# Patient Record
Sex: FEMALE | Race: BLACK OR AFRICAN AMERICAN | Employment: FULL TIME | URBAN - METROPOLITAN AREA
[De-identification: names, ages, dates, MRNs, and addresses within clinical notes are randomized per-mention and may not be internally consistent; named-entity substitution may affect disease eponyms.]

---

## 2017-01-26 ENCOUNTER — OFFICE VISIT (OUTPATIENT)
Dept: FAMILY MEDICINE CLINIC | Facility: CLINIC | Age: 27
End: 2017-01-26

## 2017-01-26 VITALS
HEART RATE: 80 BPM | TEMPERATURE: 98 F | OXYGEN SATURATION: 98 % | RESPIRATION RATE: 18 BRPM | DIASTOLIC BLOOD PRESSURE: 78 MMHG | SYSTOLIC BLOOD PRESSURE: 102 MMHG

## 2017-01-26 DIAGNOSIS — N30.01 ACUTE CYSTITIS WITH HEMATURIA: Primary | ICD-10-CM

## 2017-01-26 LAB
BILIRUBIN: NEGATIVE
CONTROL LINE PRESENT WITH A CLEAR BACKGROUND (YES/NO): YES YES/NO
KETONES (URINE DIPSTICK): NEGATIVE MG/DL
MULTISTIX LOT#: ABNORMAL NUMERIC
NITRITE, URINE: NEGATIVE
PH, URINE: 7 (ref 4.5–8)
PREGNANCY TEST, URINE: NEGATIVE
SPECIFIC GRAVITY: 1.02 (ref 1–1.03)
UROBILINOGEN,SEMI-QN: 0.2 MG/DL (ref 0–1.9)

## 2017-01-26 PROCEDURE — 87086 URINE CULTURE/COLONY COUNT: CPT

## 2017-01-26 PROCEDURE — 81025 URINE PREGNANCY TEST: CPT

## 2017-01-26 PROCEDURE — 87186 SC STD MICRODIL/AGAR DIL: CPT

## 2017-01-26 PROCEDURE — 87077 CULTURE AEROBIC IDENTIFY: CPT

## 2017-01-26 PROCEDURE — 99203 OFFICE O/P NEW LOW 30 MIN: CPT

## 2017-01-26 PROCEDURE — 81003 URINALYSIS AUTO W/O SCOPE: CPT

## 2017-01-26 RX ORDER — SULFAMETHOXAZOLE AND TRIMETHOPRIM 800; 160 MG/1; MG/1
TABLET ORAL
Qty: 10 TABLET | Refills: 0 | Status: SHIPPED | OUTPATIENT
Start: 2017-01-26

## 2017-01-26 RX ORDER — METRONIDAZOLE 500 MG/1
500 TABLET ORAL 3 TIMES DAILY
COMMUNITY

## 2017-01-27 NOTE — PATIENT INSTRUCTIONS
URINARY TRACT INFECTION AVOIDANCE AND PREVENTION    1)  Avoid a full bladder or overfilled bladder. Be sure to wait an addiitional 10-15 seconds after you think you are done, as you may be able to finish going or go again.     \"Count to 10 and go again\"

## 2017-01-27 NOTE — PROGRESS NOTES
Berna Rocha is a 32year old female. CHIEF COMPLAINT:   Patient presents with:  UTI:  progessing over last 2days    HPI:   Patient presents with symptoms of UTI. Reports 2 day history of worsening urinary frequency and dysuria.   Denies flank pain, he Specimen Collected: 01/26/17  7:51 PM Last Resulted: 01/26/17  7:51 PM Lab Flowsheet Order Details View Encounter Lab and Collection Details Routing Result History                    URINALYSIS, AUTO, W/O SCOPE   Status: Final result     Visible to patient 2)  Do not postpone urinating or rush during urination. You want to be able to completely empty your bladder. 3)  Increase fluid intake at the first sign of infection. 4)  Do not use the same tissue to blow your nose as you do to wipe yourself.  Your hand

## 2017-07-29 ENCOUNTER — HOSPITAL ENCOUNTER (EMERGENCY)
Facility: HOSPITAL | Age: 27
Discharge: HOME | End: 2017-07-29
Attending: EMERGENCY MEDICINE
Payer: MEDICAID

## 2017-07-29 VITALS
RESPIRATION RATE: 18 BRPM | SYSTOLIC BLOOD PRESSURE: 112 MMHG | BODY MASS INDEX: 20.77 KG/M2 | OXYGEN SATURATION: 100 % | DIASTOLIC BLOOD PRESSURE: 70 MMHG | WEIGHT: 110 LBS | HEIGHT: 61 IN | TEMPERATURE: 98 F | HEART RATE: 70 BPM

## 2017-07-29 DIAGNOSIS — L03.116 CELLULITIS OF LEFT LOWER EXTREMITY: Primary | ICD-10-CM

## 2017-07-29 PROCEDURE — 99283 EMERGENCY DEPT VISIT LOW MDM: CPT

## 2017-07-29 RX ORDER — CLINDAMYCIN HYDROCHLORIDE 300 MG/1
300 CAPSULE ORAL EVERY 6 HOURS SCHEDULED
Status: DISCONTINUED | OUTPATIENT
Start: 2017-07-29 | End: 2017-07-29

## 2017-07-29 RX ORDER — CLINDAMYCIN HYDROCHLORIDE 300 MG/1
300 CAPSULE ORAL 3 TIMES DAILY
Qty: 30 CAPSULE | Refills: 0 | Status: SHIPPED | OUTPATIENT
Start: 2017-07-29 | End: 2017-08-05

## 2017-07-29 RX ORDER — IBUPROFEN 600 MG/1
600 TABLET ORAL ONCE
Status: COMPLETED | OUTPATIENT
Start: 2017-07-29 | End: 2017-07-29

## 2017-07-29 NOTE — ED PROVIDER NOTES
Patient Seen in: [facility] AND [facility] Emergency Department    History   Patient presents with:  Abscess (integumentary)    Stated Complaint: pt has 2 boils to inner thigh    HPI    30-year-old female presents for evaluation of possible boil.   Patient report (Temporal)   Resp 17   Ht 154.9 cm (5' 1\")   Wt 49.9 kg   SpO2 100%   BMI 20.78 kg/m²         Physical Exam   Constitutional: She appears well-developed and well-nourished. No distress. HENT:   Head: Normocephalic and atraumatic.    Mouth/Throat: Orophar

## 2019-12-16 ENCOUNTER — WALK IN (OUTPATIENT)
Dept: URGENT CARE | Age: 29
End: 2019-12-16

## 2019-12-16 DIAGNOSIS — J02.0 STREP THROAT: Primary | ICD-10-CM

## 2019-12-16 LAB
INTERNAL PROCEDURAL CONTROLS ACCEPTABLE: YES
S PYO AG THROAT QL IA.RAPID: POSITIVE

## 2019-12-16 PROCEDURE — 99203 OFFICE O/P NEW LOW 30 MIN: CPT | Performed by: NURSE PRACTITIONER

## 2019-12-16 PROCEDURE — 87880 STREP A ASSAY W/OPTIC: CPT | Performed by: NURSE PRACTITIONER

## 2019-12-16 RX ORDER — PENICILLIN V POTASSIUM 500 MG/1
500 TABLET ORAL 2 TIMES DAILY
Qty: 20 TABLET | Refills: 0 | Status: SHIPPED | OUTPATIENT
Start: 2019-12-16 | End: 2019-12-26

## 2019-12-16 ASSESSMENT — ENCOUNTER SYMPTOMS
SORE THROAT: 1
SHORTNESS OF BREATH: 0
RHINORRHEA: 0
EYE PAIN: 0
SINUS PAIN: 0
SINUS PRESSURE: 0
TROUBLE SWALLOWING: 1
NAUSEA: 0
WHEEZING: 0
CONSTIPATION: 0
COUGH: 0
WEAKNESS: 0
FATIGUE: 0
FEVER: 0
HEADACHES: 0
CHILLS: 0
DIARRHEA: 0

## 2019-12-16 ASSESSMENT — PAIN SCALES - GENERAL: PAINLEVEL: 9-10

## 2021-05-25 VITALS
OXYGEN SATURATION: 99 % | DIASTOLIC BLOOD PRESSURE: 60 MMHG | BODY MASS INDEX: 20.23 KG/M2 | HEIGHT: 63 IN | TEMPERATURE: 100.2 F | HEART RATE: 94 BPM | SYSTOLIC BLOOD PRESSURE: 120 MMHG | WEIGHT: 114.2 LBS

## 2022-11-20 ENCOUNTER — APPOINTMENT (OUTPATIENT)
Dept: CT IMAGING | Age: 32
End: 2022-11-20

## 2022-11-20 ENCOUNTER — APPOINTMENT (OUTPATIENT)
Dept: GENERAL RADIOLOGY | Age: 32
End: 2022-11-20

## 2022-11-20 ENCOUNTER — HOSPITAL ENCOUNTER (EMERGENCY)
Age: 32
Discharge: HOME | End: 2022-11-20

## 2022-11-20 VITALS
HEIGHT: 60 IN | BODY MASS INDEX: 22.58 KG/M2 | WEIGHT: 115 LBS | OXYGEN SATURATION: 98 % | DIASTOLIC BLOOD PRESSURE: 74 MMHG | RESPIRATION RATE: 18 BRPM | HEART RATE: 116 BPM | TEMPERATURE: 98.8 F | SYSTOLIC BLOOD PRESSURE: 118 MMHG

## 2022-11-20 DIAGNOSIS — S00.83XA CONTUSION OF FACE, INITIAL ENCOUNTER: Primary | ICD-10-CM

## 2022-11-20 DIAGNOSIS — S22.31XA CLOSED FRACTURE OF ONE RIB OF RIGHT SIDE, INITIAL ENCOUNTER: ICD-10-CM

## 2022-11-20 LAB
APPEARANCE UR: CLEAR
BILIRUB UR QL STRIP: NEGATIVE
COLOR UR: YELLOW
GLUCOSE UR STRIP-MCNC: NEGATIVE MG/DL
HCG UR QL: POSITIVE
HGB UR QL STRIP: NEGATIVE
KETONES UR STRIP-MCNC: NEGATIVE MG/DL
LEUKOCYTE ESTERASE UR QL STRIP: ABNORMAL
NITRITE UR QL STRIP: NEGATIVE
PH UR STRIP: 8.5 UNITS (ref 5–7)
PROT UR STRIP-MCNC: 30 MG/DL
SP GR UR STRIP: 1.02 (ref 1–1.03)
UROBILINOGEN UR STRIP-MCNC: 1 MG/DL

## 2022-11-20 PROCEDURE — 81003 URINALYSIS AUTO W/O SCOPE: CPT

## 2022-11-20 PROCEDURE — 84703 CHORIONIC GONADOTROPIN ASSAY: CPT

## 2022-11-20 PROCEDURE — 70486 CT MAXILLOFACIAL W/O DYE: CPT

## 2022-11-20 PROCEDURE — G1004 CDSM NDSC: HCPCS

## 2022-11-20 PROCEDURE — 71101 X-RAY EXAM UNILAT RIBS/CHEST: CPT

## 2022-11-20 PROCEDURE — 99284 EMERGENCY DEPT VISIT MOD MDM: CPT

## 2022-11-20 PROCEDURE — 99283 EMERGENCY DEPT VISIT LOW MDM: CPT | Performed by: PHYSICIAN ASSISTANT

## 2022-11-20 RX ORDER — IBUPROFEN 600 MG/1
600 TABLET ORAL EVERY 8 HOURS PRN
Qty: 12 TABLET | Refills: 0 | Status: SHIPPED | OUTPATIENT
Start: 2022-11-20

## 2022-11-20 RX ORDER — HYDROCODONE BITARTRATE AND ACETAMINOPHEN 5; 325 MG/1; MG/1
1-2 TABLET ORAL EVERY 4 HOURS PRN
Qty: 12 TABLET | Refills: 0 | Status: SHIPPED | OUTPATIENT
Start: 2022-11-20

## 2022-11-20 ASSESSMENT — ENCOUNTER SYMPTOMS
NAUSEA: 0
DIARRHEA: 0
ABDOMINAL PAIN: 0
DIZZINESS: 0
BACK PAIN: 0
CHILLS: 0
HEADACHES: 0
RHINORRHEA: 0
VOMITING: 0
SORE THROAT: 0
SHORTNESS OF BREATH: 0
FEVER: 0
COUGH: 0

## 2025-01-21 ENCOUNTER — LAB ENCOUNTER (OUTPATIENT)
Dept: LAB | Facility: HOSPITAL | Age: 35
End: 2025-01-21
Payer: MEDICAID

## 2025-01-21 ENCOUNTER — TELEPHONE (OUTPATIENT)
Facility: CLINIC | Age: 35
End: 2025-01-21

## 2025-01-21 ENCOUNTER — OFFICE VISIT (OUTPATIENT)
Facility: CLINIC | Age: 35
End: 2025-01-21

## 2025-01-21 VITALS — WEIGHT: 133 LBS | BODY MASS INDEX: 25 KG/M2

## 2025-01-21 DIAGNOSIS — R10.30 LOWER ABDOMINAL PAIN: ICD-10-CM

## 2025-01-21 DIAGNOSIS — R10.10 UPPER ABDOMINAL PAIN: ICD-10-CM

## 2025-01-21 DIAGNOSIS — R10.10 UPPER ABDOMINAL PAIN: Primary | ICD-10-CM

## 2025-01-21 DIAGNOSIS — K21.9 GASTROESOPHAGEAL REFLUX DISEASE, UNSPECIFIED WHETHER ESOPHAGITIS PRESENT: Primary | ICD-10-CM

## 2025-01-21 DIAGNOSIS — R14.0 BLOATING: ICD-10-CM

## 2025-01-21 DIAGNOSIS — K21.9 GASTROESOPHAGEAL REFLUX DISEASE, UNSPECIFIED WHETHER ESOPHAGITIS PRESENT: ICD-10-CM

## 2025-01-21 DIAGNOSIS — K59.00 CONSTIPATION, UNSPECIFIED CONSTIPATION TYPE: ICD-10-CM

## 2025-01-21 LAB — IGA SERPL-MCNC: 239.3 MG/DL (ref 40–350)

## 2025-01-21 PROCEDURE — 99204 OFFICE O/P NEW MOD 45 MIN: CPT

## 2025-01-21 PROCEDURE — 83013 H PYLORI (C-13) BREATH: CPT

## 2025-01-21 PROCEDURE — 36415 COLL VENOUS BLD VENIPUNCTURE: CPT

## 2025-01-21 PROCEDURE — 82784 ASSAY IGA/IGD/IGG/IGM EACH: CPT

## 2025-01-21 PROCEDURE — 86364 TISS TRNSGLTMNASE EA IG CLAS: CPT

## 2025-01-21 RX ORDER — OMEPRAZOLE 40 MG/1
40 CAPSULE, DELAYED RELEASE ORAL DAILY
COMMUNITY
Start: 2023-11-22 | End: 2025-01-24 | Stop reason: ALTCHOICE

## 2025-01-21 NOTE — PATIENT INSTRUCTIONS
-go to lab for H. Pylori test & blood work    -call to schedule ultrasound abdomen #[phone]    -decrease juice intake    -decrease dairy intake    -avoid alcohol     -follow up 1 month after EGD     -------------------------------------------------------------------------------------------------    1. Schedule upper endoscopy (EGD) with [facility] Endoscopist [Diagnosis: GERD, upper abdominal pain]    Medication Changes: N/A     2. If you start any NEW medication after your visit today, please notify us. Certain medications will need to be held before the procedure, or the procedure cannot be performed safely.     3. DO NOT TAKE: Iron (ferrous sulfate/ ferrous gluconate) pills, herbal supplements, multivitamins, or diet medications (i.e. Phentermine/Vyvanse) for 7 days before exam.  DO NOT TAKE: Any form of alcohol, recreational drugs and any forms of Erectile Dysfunction medications 24 hours prior to procedure.    4. The day BEFORE your procedure, NOTHING TO EAT OR DRINK AFTER MIDNIGHT! If your procedure is scheduled in the afternoon, you may have clear liquids only up to 3 hours before the time of your procedure. If you fail to keep your stomach empty for 3 hours prior to procedure time, your procedure may be CANCELLED. Instructions can also be found at: [email or web address]

## 2025-01-21 NOTE — H&P
[facility] - Gastroenterology                                                                                                               Reason for consult: GERD    Requesting physician or provider: Colleen Weiler, DO    Chief Complaint   Patient presents with    Consult     Heart burn; gas; fh of pancreatic cancer; fhx       HPI:   Ania Hong is a 35 year old year-old female with active diagnoses including none. Prior medical/surgical history in note table.    she is here today for evaluation  #GERD  #upper abdominal pain  #lower abdominal pain  -reports GERD symptoms of heartburn, gas, belching, for about 4 years. Has been on multiple medications including omeprazole, pepto-bismol, tums, famotidine without relief. Also tried home remedies such as baking soda. She feels like she has thick mucus in her throat that she can't clear.   -does report worse gas with dairy, described as upper abdominal pain and lower abdominal pain and feeling gas is stuck and won't move. Not relieved by gas-x  -pain & GERD symptoms occur at least 2 episodes a month and lasts a week at a time  -occasional vomiting but not frequent  -diet recall: breakfast: none // lunch or dinner: baked chicken, pork, salmon, spinach, mac & cheese, spaghetti, rice // snack: chips, granola bars // drinks: water, juice. Reports she snacks throughout the day     #constipation  -has bowel movement 3 days a week and stool varies from soft to hard. Controls with high fiber diet. She reports taking pepto bismol will help her have a bowel movement.     Patient denies symptoms of dysphagia, odynophagia, globus sensation, hematemesis, diarrhea, hematochezia, or melena. she denies recent change in appetite, fever or unintentional weight loss.      Last colonoscopy: none   Last EGD: none     NSAIDS: ibuprofen for menses sometimes   Tobacco: none   Alcohol: 2-3  weekly  Marijuana: none   Illicit drugs: none     FH GI malignancy: Dad - pancreatic cancer  FH IBD: maternal grandma - crohns disease    No history of adverse reaction to sedation  No NATHANAEL  No anticoagulants/antiplatelet  No pacemaker/defibrillator    Wt Readings from Last 6 Encounters:   01/21/25 133 lb (60.3 kg)   07/29/17 110 lb (49.9 kg)   11/09/16 110 lb (49.9 kg)        History, Medications, Allergies, ROS:      History reviewed. No pertinent past medical history.   Past Surgical History:   Procedure Laterality Date    Mirena, iud N/A 3/2016      Family Hx: History reviewed. No pertinent family history.   Social History:   Social History     Socioeconomic History    Marital status: Single   Tobacco Use    Smoking status: Never    Smokeless tobacco: Never   Substance and Sexual Activity    Alcohol use: Yes     Alcohol/week: 1.0 standard drink of alcohol     Types: 1 Glasses of wine per week        Medications (Active prior to today's visit):  Current Outpatient Medications   Medication Sig Dispense Refill    Omeprazole 40 MG Oral Capsule Delayed Release Take 1 capsule (40 mg total) by mouth daily.      Sulfamethoxazole-TMP -160 MG Oral Tab per tablet Take one tablet 2 times daily for 5 days (Patient not taking: Reported on 1/21/2025) 10 tablet 0    metRONIDAZOLE 500 MG Oral Tab Take 500 mg by mouth 3 (three) times daily. (Patient not taking: Reported on 1/21/2025)      ibuprofen 600 MG Oral Tab Take 1 tablet (600 mg total) by mouth every 6 (six) hours as needed for Pain. (Patient not taking: Reported on 1/21/2025) 20 tablet 0       Allergies:  Allergies[1]    ROS:   CONSTITUTIONAL: negative for fevers, chills, sweats  EYES Negative for scleral icterus or redness, and diplopia  HEENT: Negative for hoarseness  RESPIRATORY: Negative for cough and severe shortness of breath  CARDIOVASCULAR: Negative for crushing sub-sternal chest pain  GASTROINTESTINAL: See HPI  GENITOURINARY: Negative for  dysuria  MUSCULOSKELETAL: Negative for arthralgias and myalgias  SKIN: Negative for jaundice, rash or pruritus  NEUROLOGICAL: Negative for dizziness and headaches  BEHAVIOR/PSYCH: Negative for psychotic behavior    PHYSICAL EXAM:   Weight 133 lb (60.3 kg), not currently breastfeeding.    GEN: Alert, no acute distress, well-nourished   HEENT: anicteric sclera, neck supple, trachea midline, MMM, no palpable or tender neck or supraclavicular lymph nodes  CV: RRR, the extremities are warm and well perfused   LUNGS: No increased work of breathing, CTAB  ABDOMEN: Soft, symmetrical, non-tender without distention or guarding. No scars or lesions. Aorta is without bruit or visible pulsation. Umbilicus is midline. Normoactive bowel sounds are present, No masses, hepatomegaly or splenomegaly noted.  MSK: No erythema, no warmth, no swelling of joints  SKIN: No jaundice, no erythema, no rashes, no lesions  HEMATOLOGIC: No bleeding, no bruising  NEURO: Alert and interactive, LERMA  PSYCH: appropriate mood & affect    Labs/Imaging/Procedures:     Patient's pertinent labs and imaging were reviewed and discussed with patient today.        .  ASSESSMENT/PLAN:   Ania Hong is a 35 year old year-old female with active diagnoses including none. Prior medical/surgical history in note table.    she is here today for evaluation  #GERD  #upper abdominal pain  #lower abdominal pain  -reports GERD symptoms of heartburn, gas, belching, for about 4 years. Has been on multiple medications including omeprazole, pepto-bismol, tums, famotidine without relief. Also tried home remedies such as baking soda. She feels like she has thick mucus in her throat that she can't clear.   -does report worse gas with dairy, described as upper abdominal pain and lower abdominal pain and feeling gas is stuck and won't move. Not relieved by gas-x  -pain & GERD symptoms occur at least 2 episodes a month and lasts a week at a time  -occasional vomiting but not  frequent  -diet recall: breakfast: none // lunch or dinner: baked chicken, pork, salmon, spinach, mac & cheese, spaghetti, rice // snack: chips, granola bars // drinks: water, juice. Reports she snacks throughout the day   -abdomen is non-tender. When her gas pain occurs she points across upper abdomen, then down bilateral sides and across lower abdomen.   -etiology possibly dietary, food sensitives, H. Pylori, GERD, gastritis. Will test for H. Pylori and celiac serology. PPI trial if H. Pylori negative. Advised to decrease juice & dairy intake. EGD to assess for active gastritis, erosions, lesions. US to assess biliary system as bilateral organs given widespread pain    #constipation  -has bowel movement 3 days a week and stool varies from soft to hard. Controls with high fiber diet. She reports taking pepto bismol will help her have a bowel movement.   -discussed pepto bismol is often used for diarrhea and can be constipating. She reports pepto bismol does not help her \"gas pain\" and I recommend not taking at this time    Recommendations:  -go to lab for H. Pylori test & blood work    -call to schedule ultrasound abdomen #[phone]    -decrease juice intake    -decrease dairy intake    -avoid alcohol     -follow up 1 month after EGD     -------------------------------------------------------------------------------------------------    -Schedule upper endoscopy (EGD) with [facility] Endoscopist [Diagnosis: GERD, upper abdominal pain]    Medication Changes: N/A     Endoscopy risk/benefit discussion: I have thoroughly discussed the risks, benefits, and alternatives of endoscopic evaluation with the patient, who demonstrated understanding. This includes the potential risks of bleeding, infection, pain, anesthesia complications, and perforation, which may result in prolonged hospitalization or surgical intervention. All of the patient’s questions were addressed to their satisfaction. The patient has chosen to  proceed with the endoscopic procedure, including any necessary interventions such as polypectomy, biopsy, control of bleeding.        Orders This Visit:  No orders of the defined types were placed in this encounter.      Meds This Visit:  Requested Prescriptions      No prescriptions requested or ordered in this encounter       Imaging & Referrals:  None      HE Murguia    [facility]  1/21/2025        This note was partially prepared using Dragon Medical voice recognition dictation software. As a result, errors may occur. When identified, these errors have been corrected. While every attempt is made to correct errors during dictation, discrepancies may still exist.          [1] No Known Allergies

## 2025-01-21 NOTE — TELEPHONE ENCOUNTER
Schedulers- Patient was seen in office today, please call patient to schedule procedure per providers orders below. I reviewed and handed a copy of prep instructions with patient in office as well as medications. Patient is aware of different locations and our providers possibly booking out. No further questions asked.      1. Schedule upper endoscopy (EGD) with [facility] Endoscopist [Diagnosis: GERD, upper abdominal pain]

## 2025-01-22 LAB
H PYLORI BREATH TEST: NEGATIVE
TTG IGA SER-ACNC: 0.2 U/ML (ref ?–7)

## 2025-01-24 ENCOUNTER — TELEPHONE (OUTPATIENT)
Facility: CLINIC | Age: 35
End: 2025-01-24

## 2025-01-24 DIAGNOSIS — K21.9 GASTROESOPHAGEAL REFLUX DISEASE, UNSPECIFIED WHETHER ESOPHAGITIS PRESENT: Primary | ICD-10-CM

## 2025-01-24 RX ORDER — PANTOPRAZOLE SODIUM 40 MG/1
40 TABLET, DELAYED RELEASE ORAL
Qty: 90 TABLET | Refills: 0 | Status: SHIPPED | OUTPATIENT
Start: 2025-01-24 | End: 2025-04-24

## 2025-01-24 NOTE — TELEPHONE ENCOUNTER
H. Pylori negative. Called patient, voicemail box is not set up, mychart is also not set up.  Sent pantoprazole to pharmacy

## 2025-01-24 NOTE — TELEPHONE ENCOUNTER
Called and spoke to the patient, date of birth and name verified.    APN message relayed.    Advised the patient to call the office if new prescription is not effective.    She agreed and verbalized understanding.

## 2025-02-12 NOTE — TELEPHONE ENCOUNTER
Left message to call back-    If patient call back please forward to GI schedulers.    Thank you!

## 2025-02-24 ENCOUNTER — TELEPHONE (OUTPATIENT)
Facility: CLINIC | Age: 35
End: 2025-02-24

## 2025-02-24 NOTE — TELEPHONE ENCOUNTER
1st,overdue reminder letter mailed out to patient   Imaging order     US ABDOMEN COMPLETE (CPT=76700) (Order #[id number]) on 1/21/25

## 2025-02-24 NOTE — TELEPHONE ENCOUNTER
Scheduled for:   EGD 35540  Provider Name:   DR WILL  Date:   5/282025  Location:    [facility]  Sedation:  MAC   Time:  2:50 PM (pt is aware that ENDO  will call the day before to confirm arrival time)  Prep:  NOTHING TO EAT BY MOUTH AFTER MIDNIGHT   Meds/Allergies Reconciled?:  MILAGRO DRAPER NP  Diagnosis with codes:  UPPER ABDOMINAL PAIN  R 10.10  Was patient informed to call insurance with codes (Y/N):  Yes, I confirmed the insurance with the patient.   Referral sent?:  N/A  [facility] or [facility] notified?:  I sent an electronic request to Endo Scheduling and received a confirmation today.      Medication Orders:  This patient verbally confirmed that she  is not taking:   Iron, blood thinners, BP meds, and is not diabetic   Not latex allergy, Not PCN allergy and does not have a pacemaker     Misc Orders:       Further instructions given by staff:

## 2025-05-27 ENCOUNTER — TELEPHONE (OUTPATIENT)
Facility: CLINIC | Age: 35
End: 2025-05-27

## 2025-05-27 DIAGNOSIS — K21.9 GASTROESOPHAGEAL REFLUX DISEASE, UNSPECIFIED WHETHER ESOPHAGITIS PRESENT: Primary | ICD-10-CM

## 2025-05-27 NOTE — TELEPHONE ENCOUNTER
Anne Marie,    I spoke to patient, states has already tried Omeprazole and did not help. She has tried taking Pepcid and it worked after she was taking for about 5 days. She is ok to take that but unsure if you recommend she take it regularly or only when her symptoms are present.

## 2025-05-27 NOTE — TELEPHONE ENCOUNTER
Anne Marie Fraire APRN APN Signed 1:34 PM        Okay to try omeprazole 20mg over the counter instead. Can take once-twice daily before meals     HE Murguia Ashley, CMA Certified Medical Assistant Signed 11:51 AM        Tigist LEI spoke with patient and she states the Pantoprazole was making her stomach burn. Patient states she stopped taking due to this. Patient asking if there is another medication she can try. Please advise.   Thank you!

## 2025-05-28 NOTE — TELEPHONE ENCOUNTER
Thank you for the update. I recommend she take the famotidine (pepcid) 20mg once to twice daily for 2 months.

## 2025-06-18 NOTE — TELEPHONE ENCOUNTER
Cl Kenney    Called and spoke to the patient, date of birth and name verified.    APN message from 5/28/2025 relayed.    As per the patient, famotidine is effective.     She is requesting for a prescription.    Thank you

## 2025-06-19 RX ORDER — FAMOTIDINE 20 MG/1
20 TABLET, FILM COATED ORAL 2 TIMES DAILY PRN
Qty: 60 TABLET | Refills: 2 | Status: SHIPPED | OUTPATIENT
Start: 2025-06-19 | End: 2025-09-17

## (undated) NOTE — MR AVS SNAPSHOT
[address] [place] [address]  [phone]               Thank you for choosing us for your health care visit with HAYDER Banda. We are glad to serve you and happy to provide you with this summary of your visit.   Ghazala 102/78 mmHg 80 98 °F (36.7 °C) (Oral)            Current Medications          This list is accurate as of: 1/26/17  7:46 PM.  Always use your most recent med list.                ibuprofen 600 MG Tabs   Take 1 tablet (600 mg total) by mouth every 6 (si Support Staff. Remember, MyChart is NOT to be used for urgent needs. For medical emergencies, dial 911.            Visit [facility] online at  [email or web address]

## (undated) NOTE — ED AVS SNAPSHOT
Jyotsna Simms   MRN: [id number]    Department:  [facility] Emergency Department   Date of Visit:  7/29/2017           Disclosure     Insurance plans vary and the physician(s) referred by the ER may not be covered by your plan.  Please contact CARE PHYSICIAN AT ONCE OR RETURN IMMEDIATELY TO THE EMERGENCY DEPARTMENT. If you have been prescribed any medication(s), please fill your prescription right away and begin taking the medication(s) as directed.   If you believe that any of the medications

## (undated) NOTE — Clinical Note
January 30, 2017    [address]      Dear Geni Morejon: The following are the results of your recent tests. Please review the list of test results.   Your result is the value on the left; we have also supplied the range

## (undated) NOTE — LETTER
2/24/2025          Ania Hong    [address] [address]         Dear Ania,    Our records indicate that the tests ordered for you by HE Murguia  have not been done.  If you have, in fact, already completed the tests or you do not wish to have the tests done, please contact our office at THE NUMBER LISTED BELOW.  Otherwise, please proceed with the testing.  Enclosed is a duplicate order for your convenience.  Imaging order     US ABDOMEN COMPLETE (CPT=76700) (Order #[id number]) on 1/21/25     To schedule a test at any [facility], call Central Scheduling at [phone], Monday through Friday between 7:30am to 6pm and on Saturday between 8am and 1pm.   Evening and weekend appointments for your exam are available.       Sincerely,    HE Murguia  [facility]  [address]  [facility] [id number] [phone]

## (undated) NOTE — LETTER
1/21/2025      Ania Hong  [address] [address]         Dear Ania,    This letter is to inform you that our office has attempted to reach you by phone without success (voicemail box not set up).  We were attempting to contact you by phone regarding scheduling an Esophagogastroduodenoscopy (EGD)    Please contact our office at the number listed below as soon as you receive this letter to discuss this issue and to make the necessary changes in our system to your contact information.       Thank you for your cooperation.      Sincerely,    RIKKI Eduardo MD  [place] [address]  [facility] [address] [phone]

## (undated) NOTE — LETTER
July 29, 2017    Patient: Federico Linares   Date of Visit: 7/29/2017       To Whom It May Concern:    Federico Linares was seen and treated in our emergency department on 7/29/2017. She should not return to work until 7/30/2017.     If you have any que